# Patient Record
Sex: FEMALE
[De-identification: names, ages, dates, MRNs, and addresses within clinical notes are randomized per-mention and may not be internally consistent; named-entity substitution may affect disease eponyms.]

---

## 2020-11-09 ENCOUNTER — HOSPITAL ENCOUNTER (EMERGENCY)
Dept: HOSPITAL 90 - EDH | Age: 30
Discharge: HOME | End: 2020-11-09
Payer: MEDICAID

## 2020-11-09 DIAGNOSIS — R10.2: Primary | ICD-10-CM

## 2020-11-09 DIAGNOSIS — Z90.49: ICD-10-CM

## 2020-11-09 DIAGNOSIS — Z72.0: ICD-10-CM

## 2020-11-09 DIAGNOSIS — Z79.899: ICD-10-CM

## 2020-11-09 DIAGNOSIS — N93.9: ICD-10-CM

## 2020-11-09 LAB
ALBUMIN SERPL-MCNC: 3.8 G/DL (ref 3.5–5)
ALT SERPL-CCNC: 39 U/L (ref 12–78)
AST SERPL-CCNC: 26 U/L (ref 10–37)
BASOPHILS NFR BLD AUTO: 0.5 % (ref 0–5)
BILIRUB SERPL-MCNC: 0.2 MG/DL (ref 0.2–1)
BUN SERPL-MCNC: 10 MG/DL (ref 7–18)
CHLORIDE SERPL-SCNC: 102 MMOL/L (ref 101–111)
CO2 SERPL-SCNC: 27 MMOL/L (ref 21–32)
CREAT SERPL-MCNC: 1 MG/DL (ref 0.5–1.5)
EOSINOPHIL NFR BLD AUTO: 0.8 % (ref 0–8)
ERYTHROCYTE [DISTWIDTH] IN BLOOD BY AUTOMATED COUNT: 12 % (ref 11–15.5)
GFR SERPL CREATININE-BSD FRML MDRD: 70 ML/MIN (ref 60–?)
GLUCOSE SERPL-MCNC: 93 MG/DL (ref 70–105)
HCG SERPL-ACNC: 0 MIU/ML (ref 0–5)
HCG UR QL: NEGATIVE
HCT VFR BLD AUTO: 40 % (ref 36–48)
LYMPHOCYTES NFR SPEC AUTO: 22 % (ref 21–51)
MCH RBC QN AUTO: 29.6 PG (ref 27–33)
MCHC RBC AUTO-ENTMCNC: 33.5 G/DL (ref 32–36)
MCV RBC AUTO: 88.5 FL (ref 79–99)
MONOCYTES NFR BLD AUTO: 5.5 % (ref 3–13)
NEUTROPHILS NFR BLD AUTO: 71 % (ref 40–77)
NRBC BLD MANUAL-RTO: 0 % (ref 0–0.19)
PH UR STRIP: 6 [PH] (ref 5–8)
PLATELET # BLD AUTO: 394 K/UL (ref 130–400)
POTASSIUM SERPL-SCNC: 3.4 MMOL/L (ref 3.5–5.1)
PROT SERPL-MCNC: 8.5 G/DL (ref 6–8.3)
RBC # BLD AUTO: 4.52 MIL/UL (ref 4–5.5)
RBC #/AREA URNS HPF: (no result) /HPF (ref 0–1)
SODIUM SERPL-SCNC: 136 MMOL/L (ref 136–145)
SP GR UR STRIP: 1.02 (ref 1–1.03)
UROBILINOGEN UR STRIP-MCNC: 0.2 MG/DL (ref 0.2–1)
WBC # BLD AUTO: 13.2 K/UL (ref 4.8–10.8)
WBC #/AREA URNS HPF: (no result) /HPF (ref 0–1)

## 2020-11-09 PROCEDURE — 86901 BLOOD TYPING SEROLOGIC RH(D): CPT

## 2020-11-09 PROCEDURE — 36415 COLL VENOUS BLD VENIPUNCTURE: CPT

## 2020-11-09 PROCEDURE — 86900 BLOOD TYPING SEROLOGIC ABO: CPT

## 2020-11-09 PROCEDURE — 87088 URINE BACTERIA CULTURE: CPT

## 2020-11-09 PROCEDURE — 81025 URINE PREGNANCY TEST: CPT

## 2020-11-09 PROCEDURE — 84702 CHORIONIC GONADOTROPIN TEST: CPT

## 2020-11-09 PROCEDURE — 85025 COMPLETE CBC W/AUTO DIFF WBC: CPT

## 2020-11-09 PROCEDURE — 76856 US EXAM PELVIC COMPLETE: CPT

## 2020-11-09 PROCEDURE — 80053 COMPREHEN METABOLIC PANEL: CPT

## 2020-11-09 PROCEDURE — 86850 RBC ANTIBODY SCREEN: CPT

## 2020-11-09 PROCEDURE — 81001 URINALYSIS AUTO W/SCOPE: CPT

## 2025-01-27 ENCOUNTER — HOSPITAL ENCOUNTER (EMERGENCY)
Dept: HOSPITAL 90 - EDH | Age: 35
Discharge: HOME | End: 2025-01-27

## 2025-01-27 VITALS — BODY MASS INDEX: 23.37 KG/M2 | HEIGHT: 60 IN | WEIGHT: 119.05 LBS

## 2025-01-27 VITALS
SYSTOLIC BLOOD PRESSURE: 109 MMHG | RESPIRATION RATE: 16 BRPM | HEART RATE: 74 BPM | OXYGEN SATURATION: 99 % | DIASTOLIC BLOOD PRESSURE: 65 MMHG | TEMPERATURE: 98.2 F

## 2025-01-27 DIAGNOSIS — E87.6: ICD-10-CM

## 2025-01-27 DIAGNOSIS — O99.281: ICD-10-CM

## 2025-01-27 DIAGNOSIS — K59.00: ICD-10-CM

## 2025-01-27 DIAGNOSIS — Z90.49: ICD-10-CM

## 2025-01-27 DIAGNOSIS — Z3A.10: ICD-10-CM

## 2025-01-27 DIAGNOSIS — O99.611: Primary | ICD-10-CM

## 2025-01-27 DIAGNOSIS — R10.2: ICD-10-CM

## 2025-01-27 LAB
BASOPHILS # BLD AUTO: 0.04 K/UL (ref 0–0.2)
BASOPHILS NFR BLD AUTO: 0.3 % (ref 0–5)
BUN SERPL-MCNC: 7 MG/DL (ref 7–18)
CHLORIDE SERPL-SCNC: 99 MMOL/L (ref 101–111)
CO2 SERPL-SCNC: 27 MMOL/L (ref 21–32)
CREAT SERPL-MCNC: 0.7 MG/DL (ref 0.5–1)
EOSINOPHIL # BLD AUTO: 0.16 K/UL (ref 0–0.7)
EOSINOPHIL NFR BLD AUTO: 1.3 % (ref 0–8)
ERYTHROCYTE [DISTWIDTH] IN BLOOD BY AUTOMATED COUNT: 11.9 % (ref 11–15.5)
GFR SERPL CREATININE-BSD FRML MDRD: 118 ML/MIN (ref 90–?)
GLUCOSE SERPL-MCNC: 115 MG/DL (ref 70–105)
HCT VFR BLD AUTO: 36.9 % (ref 36–48)
IMM GRANULOCYTES # BLD: 0.05 K/UL (ref 0–1)
LYMPHOCYTES # SPEC AUTO: 2.2 K/UL (ref 1–4.8)
LYMPHOCYTES NFR SPEC AUTO: 18.7 % (ref 21–51)
MCH RBC QN AUTO: 30.3 PG (ref 27–33)
MCHC RBC AUTO-ENTMCNC: 34.7 G/DL (ref 32–36)
MCV RBC AUTO: 87.4 FL (ref 79–99)
MONOCYTES # BLD AUTO: 0.8 K/UL (ref 0.1–1)
MONOCYTES NFR BLD AUTO: 6.7 % (ref 3–13)
NEUTROPHILS # BLD AUTO: 8.7 K/UL (ref 1.8–7.7)
NEUTROPHILS NFR BLD AUTO: 72.6 % (ref 40–77)
NRBC BLD MANUAL-RTO: 0 % (ref 0–0.19)
PLATELET # BLD AUTO: 387 K/UL (ref 130–400)
POTASSIUM SERPL-SCNC: 3.3 MMOL/L (ref 3.5–5.1)
RBC # BLD AUTO: 4.22 MIL/UL (ref 4–5.5)
SODIUM SERPL-SCNC: 132 MMOL/L (ref 136–145)
WBC # BLD AUTO: 12 K/UL (ref 4.8–10.8)

## 2025-01-27 PROCEDURE — 85025 COMPLETE CBC W/AUTO DIFF WBC: CPT

## 2025-01-27 PROCEDURE — 86901 BLOOD TYPING SEROLOGIC RH(D): CPT

## 2025-01-27 PROCEDURE — 84702 CHORIONIC GONADOTROPIN TEST: CPT

## 2025-01-27 PROCEDURE — 36415 COLL VENOUS BLD VENIPUNCTURE: CPT

## 2025-01-27 PROCEDURE — 99284 EMERGENCY DEPT VISIT MOD MDM: CPT

## 2025-01-27 PROCEDURE — 80048 BASIC METABOLIC PNL TOTAL CA: CPT

## 2025-01-27 PROCEDURE — 86850 RBC ANTIBODY SCREEN: CPT

## 2025-01-27 PROCEDURE — 86900 BLOOD TYPING SEROLOGIC ABO: CPT

## 2025-01-27 PROCEDURE — 76801 OB US < 14 WKS SINGLE FETUS: CPT

## 2025-01-27 NOTE — ERN
ED Note


History of Present Illness


Stated Complaint:  ABD PAIN, 10 WEEKS PREGNANT


Chief Complaint:  Abdominal Pain in Pregnancy


Time Seen by MD:  18:06


Dictation:


PATIENT IS A 34YEAR OLD FEMALE HERE WITH COMPLAINTS OF CONSTIPATION FOR THE LAST

7-8 DAYS.  NO NAUSEA VOMITING SHE STATES SHE HAS BEEN ABLE TO EAT.  SHE ALSO 

STATES SHE IS 10 WEEKS PREGNANT, SEEING DR. WALKER AT [facility].  NO VAGINAL 

BLEEDING NO CONTRACTIONS  SHE STATES SHE HAS TRIED OVER-THE-COUNTER EX-LAX 

AND HAS TAKEN DOCUSATE SODIUM FROM HER OB GYN DOCTOR.  SHE STATES HOWEVER IT HAS

NOT BEEN SUCCESSFUL.


Allergies:  


Coded Allergies:  


     No Known Allergies (Unverified  Allergy, Unknown, 25)





Past Medical History


Past Medical History:  No Pertinent History


Surgical History:  Cholecystectomy


LMP:  Sep 25, 2024


:  3


Para:  2


Aborts:  2


RN Note Reviewed/Agreed w/PFSH:  Yes





Review of System


Dictation


CONSTITUTIONAL:  NEGATIVE EXCEPT FOR HPI


HEAD/FACE:  NEGATIVE EXCEPT FOR HPI


EENT:  NEGATIVE EXCEPT FOR HPI


RESPIRATORY: NEGATIVE EXCEPT FOR HPI


GASTROINTESTINAL/ABDOMINAL:   NEGATIVE EXCEPT FOR HPI ABDOMINAL 

PAIN/CONSTIPATION


GENITOURINARY: NEGATIVE EXCEPT FOR HPI


MUSCULOSKELETAL: NEGATIVE EXCEPT FOR HPI


INTEGUMENTARY:  NEGATIVE EXCEPT FOR HPI


NEUROLOGICAL/PSYCH: NEGATIVE EXCEPT FOR HPI


HEMATOLOGIC/LYMPHATIC:  NEGATIVE EXCEPT FOR HPI





ALL SYSTEMS NEGATIVE, EXCEPT AS NOTED ABOVE.





13 POINT REVIEW OF SYSTEMS ASSESSED AND ALL NEGATIVE EXCEPT FOR ABOVE.





Initial Vital Sign


VS





                                   Vital Signs








  Date Time  Temp Pulse Resp B/P (MAP) Pulse Ox O2 Delivery O2 Flow Rate FiO2


 


25 18:05 98.2 74 16 109/65 99 Room Air 0 


 


25 21:20        21











Physical Exam


Dictation


VITAL SIGNS REVIEWED 


GENERAL APPEARANCE: ALERT, ORIENTED X 3, NO ACUTE DISTRESS, WELL DEVELOPED, 

NOURISHED. 


HEAD AND FACE: NON-TRAUMATIC.


EYES: PERRL, PINK CONJUNCTIVAS, EYELID NO TRAUMA, ANTERIOR CHAMBER WITH ARCUS 

SENILIS. 


EARS: PINNAS INTACT AND NO SIGNS OF TRAUMA OR ERYTHEMA EAR CANALS CLEAR AND NO 

DISCHARGE TM NO ERYTHEMA 


NOSE: NO DISCHARGE, NO BLEEDING. 


OROPHARYNX: MOUTH NORMAL, TONGUE PINK, 


PHARYNX CLEAR,NO ERYTHEMA, TONSILS NO EXUDATES, NO ABSCESSES NOTED,  MUCOUS 

MEMBRANE MOIST 


NECK: SUPPLE, NON-TENDER, NO THYROMEGALY, NO MASSES, NO JVD, NO BRUITS 


BREAST:DEFERRED 


CHEST:NO TENDERNESS, NO CREPITUS, NO PARADOXICAL MOVEMENT, NO RETRACTIONS 


LUNGS:CLEAR, WELL-VENTILATED, SYMMETRIC, NO RALES, NO WHEEZING, NO RHONCHI, NO 

STRIDOR, GOOD BREATH SOUNDS BILATERALLY 


HEART: REGULAR RATE, REGULAR RHYTHM, NO MURMUR, NO GALLOPS 


VASCULAR: NO PERIPHERAL EDEMA, 


ABDOMEN: SOFT, POSITIVE BOWEL SOUNDS, NONDISTENDED, NO GUARDING, 


NONTENDER, NO REBOUND, NO MASSES NO HEPATOMEGALY, NO SPLENOMEGALY, NO ENGLAND'S 

SIGN, NO HERNIAS.


RECTAL: DEFERRED


GENITAL: DEFERRED


NEUROLOGICAL: NORMAL SPEECH,  MOTOR FUNCTION INTACT, SENSORY FUNCTION INTACT 


MUSCULOSKELETAL: NECK NONTENDER, FULL RANGE OF MOTION, BACK NONTENDER, FULL 

RANGE OF MOTION,


EXTREMITIES: NONTENDER, FULL RANGE OF MOTION 


SKIN: COLOR PINK, DRY, NO TURGOR, NO RASH, NO LACERATIONS, NO ABRASIONS, NO 

CONTUSIONS.


LYMPHATIC: DEFERRED





Results (Laboratory/Radiology)


Laboratory/Radiology





Laboratory Tests








Test


 25


19:30


 


White Blood Count


 12.0 K/uL


(4.8-10.8)  H


 


Red Blood Count


 4.22 MIL/uL


(4.00-5.50)


 


Hemoglobin


 12.8 g/dL


(12.0-16.0)


 


Hematocrit


 36.9 % (36-48)





 


Mean Corpuscular Volume


 87.4 fL


(79-99)


 


Mean Corpuscular Hemoglobin


 30.3 pg


(27.0-33.0)


 


Mean Corpuscular Hemoglobin


Concent 34.7 g/dL


(32.0-36.0)


 


Red Cell Distribution Width


 11.9 %


(11.0-15.5)


 


Platelet Count


 387 K/uL


(130-400)


 


Mean Platelet Volume


 10.4 fL


(7.5-10.5)


 


Immature Granulocyte % (Auto) 0.4 % (0-1)  


 


Neutrophils (%) (Auto)


 72.6 %


(40.0-77.0)


 


Lymphocytes (%) (Auto)


 18.7 %


(21.0-51.0)  L


 


Monocytes (%) (Auto)


 6.7 %


(3.0-13.0)


 


Eosinophils (%) (Auto)


 1.3 %


(0.0-8.0)


 


Basophils (%) (Auto)


 0.3 %


(0.0-5.0)


 


Neutrophils # (Auto)


 8.7 K/uL


(1.8-7.7)  H


 


Lymphocytes # (Auto)


 2.2 K/uL


(1.0-4.8)


 


Monocytes # (Auto)


 0.8 K/uL


(0.1-1.0)


 


Eosinophils # (Auto)


 0.16 K/uL


(0.00-0.70)


 


Basophils # (Auto)


 0.04 K/uL


(0.00-0.20)


 


Absolute Immature Granulocyte


(auto 0.05 K/uL


(0-1)


 


Nucleated Red Blood Cells


 0.0 %


(0.0-0.19)


 


Sodium Level


 132 mmol/L


(136-145)  L


 


Potassium Level


 3.3 mmol/L


(3.5-5.1)  L


 


Chloride Level


 99 mmol/L


(101-111)  L


 


Carbon Dioxide Level


 27 mmol/L


(21-32)


 


Blood Urea Nitrogen


 7 mg/dL (7-18)





 


Creatinine


 0.7 mg/dL


(0.5-1.0)


 


Glomerular Filtration Rate


Calc 118 mL/min


(>90)


 


Random Glucose


 115 mg/dL


()  H


 


Total Calcium


 9.1 mg/dL


(8.5-10.1)


 


Human Chorionic Gonadotropin,


Quant 92560 mIU/mL


(0-5)  H





ULTRASOUND DEMONSTRATES IUP 10 WEEKS SIX DAYS, FETAL HEART RATE 164


PLACENTA IS LOCATED PROXIMAL THE CERVICAL OS


Labs Reviewed?:  Yes





ED Course


ED Course





Orders








Procedure Category Date Status





  Time 


 


Cbc With Differential LAB 25 Complete





  18:08 


 


Hcg,Quantitative LAB 25 Complete





  18:08 


 


Us Ob <14 Weeks US 25 Resulted





  18:08 


 


Type And Screen BBK 25 Complete





  18:08 


 


Basic Metabolic Panel LAB 25 Complete





  18:08 


 


Bisacodyl (Dulcolax) PHA 25 Complete





  18:30 








Current Medications








 Medications


  (Trade)  Dose


 Ordered  Sig/Kait


 Route


 PRN Reason  Start Time


 Stop Time Status Last Admin


Dose Admin


 


 Bisacodyl


  (DulcoLAX)  10 mg  ONCE  ONCE


 RC


   25 18:30


 25 18:31 DC  











Vital Signs








  Date Time  Temp Pulse Resp B/P (MAP) Pulse Ox O2 Delivery O2 Flow Rate FiO2


 


25 21:20 98.2 74 16 109/65 99 Room Air* 0 21


 


25 18:05 98.2 74 16 109/65 99 Room Air 0 











2135/SPOKE WITH PATIENT AT LENGTH REGARDING FINDINGS ON ULTRASOUND AND LABS.  

SHE IS ALSO AWARE OF THE PLACENTA IS LOW ON THE CERVIX.  FINALLY SHE WAS MADE 

AWARE TO STOP THE EX-LAX AND I WILL PRESCRIBE LACTULOSE WHICH IS CATEGORY B





Medical Decision Making


MDM


MEDICAL DISCHARGE MAKING BASED ON BASIC LABS AND ULTRASOUND FOR OB


PATIENT GIVEN PRUNE JUICE AND DULCOLAX SUPPOSITORY.


DISCHARGED HOME WITH LACTULOSE


TOLD FOLLOW THE LACTULOSE WITH HOT COFFEE OR HOT TEA


SEE HER OB GYN DOCTOR IN THE NEXT ONE TWO DAYS FOR FOLLOW UP





DX & DISP


Disposition:  Discharge


Departure


Impression:  


   Primary Impression:  Acute constipation


   Additional Impressions:  Pregnant and not yet delivered in first trimester, 

   Hypokalemia


Condition:  Stable


Scripts


Lactulose (Lactulose) 10 Gram/15 Ml Solution


30 ML PO BID for constipation, #250 ML 0 Refills


   TAKE 30 ML TWICE A DAY AS NEEDED FOR CONSTIPATION.


   


   SUGGEST HOT COFFEE YOUR ARE T1 HOUR AFTER INGESTION.


   Prov: WALT KAY NP         25


Referrals:  


HOWARD JOSHI MD (PCP)


Time of Disposition:  21:37


I have reviewed the case, and I agree with, Diagnosis and Plan











WALT KAY NP              2025 18:12

## 2025-01-27 NOTE — HMCIMG
ULTRASOUND OF THE PELVIS

ULTRASOUND ABD VASCULAR LIMITED



INDICATION: Pelvic pain



COMPARISONS: None 



TECHNIQUE:  Transabdominal real-time sonographic images were acquired

earlier, and subsequently made available for review.



FINDINGS: 



The uterus measures 12.2 x 7.1 x 6.5 cm. The uterus is normal in

echotexture and contour.



Single live intrauterine gestation corresponds to sonographic

gestational age of 10 weeks 6 days +/- 6 days based on crown-rump length

of 3.9 cm. No abnormal subchorionic hypoechoic area demonstrated. Fetal

heart rate = 164 BPM. Tip of the placenta near the internal cervical os.



The right ovary measures 2.4 x 3.1 x 1.3 cm. The right ovary is normal

in size, shape and echogenicity. No right adnexal masses demonstrated. 



Color Doppler flow is normal throughout the right ovary. Spectral

Doppler analysis demonstrates a normal waveform pattern.



The left ovary measures 2.1 x 2.6 x 2.0 cm. The left ovary is normal in

size, shape and echogenicity. No left adnexal masses demonstrated. 



Color Doppler flow is normal throughout the left ovary. Spectral Doppler

analysis demonstrates a normal waveform pattern.



No free pelvic fluid demonstrated.



IMPRESSION:



1. Single live intrauterine gestation corresponding to sonographic

gestational age of 10 weeks 6 days +/- 6 days based on crown-rump

length, and with fetal heart rate = 1/1/1964 BPM.

2. Tip of placenta near the internal cervical os. This can be

reevaluated on the follow-up imaging.

3. GWENDOLYN = 8/19/2025.